# Patient Record
Sex: MALE | Race: WHITE | NOT HISPANIC OR LATINO | Employment: OTHER | URBAN - METROPOLITAN AREA
[De-identification: names, ages, dates, MRNs, and addresses within clinical notes are randomized per-mention and may not be internally consistent; named-entity substitution may affect disease eponyms.]

---

## 2018-02-15 ENCOUNTER — OFFICE VISIT (OUTPATIENT)
Dept: OTOLARYNGOLOGY | Facility: CLINIC | Age: 38
End: 2018-02-15
Payer: COMMERCIAL

## 2018-02-15 ENCOUNTER — CLINICAL SUPPORT (OUTPATIENT)
Dept: AUDIOLOGY | Facility: CLINIC | Age: 38
End: 2018-02-15
Payer: COMMERCIAL

## 2018-02-15 VITALS
BODY MASS INDEX: 22.03 KG/M2 | HEART RATE: 82 BPM | SYSTOLIC BLOOD PRESSURE: 122 MMHG | WEIGHT: 153.88 LBS | DIASTOLIC BLOOD PRESSURE: 78 MMHG | HEIGHT: 70 IN

## 2018-02-15 DIAGNOSIS — H90.11 CONDUCTIVE HEARING LOSS OF RIGHT EAR WITH UNRESTRICTED HEARING OF LEFT EAR: ICD-10-CM

## 2018-02-15 DIAGNOSIS — J34.2 DEVIATED NASAL SEPTUM: ICD-10-CM

## 2018-02-15 DIAGNOSIS — H90.11 CONDUCTIVE HEARING LOSS OF RIGHT EAR WITH UNRESTRICTED HEARING OF LEFT EAR: Primary | ICD-10-CM

## 2018-02-15 DIAGNOSIS — H69.91 EUSTACHIAN TUBE DYSFUNCTION, RIGHT: Primary | ICD-10-CM

## 2018-02-15 DIAGNOSIS — J30.2 PERENNIAL ALLERGIC RHINITIS WITH SEASONAL VARIATION: ICD-10-CM

## 2018-02-15 DIAGNOSIS — J30.89 PERENNIAL ALLERGIC RHINITIS WITH SEASONAL VARIATION: ICD-10-CM

## 2018-02-15 PROCEDURE — 92550 TYMPANOMETRY & REFLEX THRESH: CPT | Mod: S$GLB,,, | Performed by: AUDIOLOGIST

## 2018-02-15 PROCEDURE — 3008F BODY MASS INDEX DOCD: CPT | Mod: S$GLB,,, | Performed by: OTOLARYNGOLOGY

## 2018-02-15 PROCEDURE — 31231 NASAL ENDOSCOPY DX: CPT | Mod: S$GLB,,, | Performed by: OTOLARYNGOLOGY

## 2018-02-15 PROCEDURE — 92557 COMPREHENSIVE HEARING TEST: CPT | Mod: S$GLB,,, | Performed by: AUDIOLOGIST

## 2018-02-15 PROCEDURE — 99999 PR PBB SHADOW E&M-NEW PATIENT-LVL III: CPT | Mod: PBBFAC,,, | Performed by: OTOLARYNGOLOGY

## 2018-02-15 PROCEDURE — 99204 OFFICE O/P NEW MOD 45 MIN: CPT | Mod: 25,S$GLB,, | Performed by: OTOLARYNGOLOGY

## 2018-02-15 PROCEDURE — 99999 PR PBB SHADOW E&M-EST. PATIENT-LVL I: CPT | Mod: PBBFAC,,,

## 2018-02-15 NOTE — PROCEDURES
Nasal/sinus endoscopy  Date/Time: 2/15/2018 11:30 AM  Performed by: MARY DOMINGUEZ  Authorized by: MARY DOMINGUEZ     Consent Done?:  Yes (Verbal)  Anesthesia:     Local anesthetic:  Lidocaine 4% and Leon-Synephrine 1/2%    Patient tolerance:  Patient tolerated the procedure well with no immediate complications  Nose:     Procedure Performed:  Nasal Endoscopy  External:      No external nasal deformity  Intranasal:      Mucosa no polyps     Mucosa ulcers not present     No mucosa lesions present     Turbinates not enlarged     No septum gross deformity  Nasopharynx:      No mucosa lesions     Adenoids not present     Posterior choanae patent     Eustachian tube patent

## 2018-02-15 NOTE — PROGRESS NOTES
Mr. Araya was seen in the clinic today for a hearing evaluation. He reported a history of recurrent ear infections. Today, he reported decreased hearing in his right ear.     Audiological testing revealed a mild conductive hearing loss in the right ear and normal hearing in the left ear. A speech reception threshold was obtained at 25 dBHL in the right ear and 15 dBHL in the left ear. Speech discrimination was 100%, bilaterally.    Tympanometry revealed a Type B tympanogram in the right ear and a Type A tympanogram in the left ear.    Ipsilateral acoustic reflexes were absent, bilaterally.     Recommendations:  1. Otologic evaluation  2. Follow-up hearing evaluation  3. Noise protection

## 2018-02-15 NOTE — PROGRESS NOTES
Subjective:      Tirso Araya is a 37 y.o. male who was referred by a Dr. Aj for aural fullness.    He relates a long history of chronic feeling of fluid in the ears, which seemed to worsen upon moving to [place] from [place], [place], in 2014.  Prior to that, he had contended with allergies to tree pollen, though the rhinorrhea and sneezing seemed to have improved with the move to [place].  However, he now reports a nearly daily feeling of fluid in the ears, more prominent in the right ear, with associated hearing loss.  He denies nasal blockage, facial pressure, hyposmia or notable sinus infections.  He uses Claritin and Flonase for control of allergies, and denies a history of asthma.  He denies prior nasal trauma or surgery other than adenotonsillectomy as a child.    SNOT-22 score = 32, NOSE score = 20%, ETDQ-7 score = 4.0    Global QOL = 5%    Days missed = Less than 5      Past Medical History  He has a past medical history of Allergy.    Past Surgical History  He has a past surgical history that includes Adenoidectomy (2006) and Tonsillectomy (2006).    Family History  His family history includes Cancer in his father and paternal grandmother; Heart failure in his maternal grandmother; No Known Problems in his brother, maternal grandfather, paternal grandfather, and sister; Thyroid disease in his mother.    Social History  He reports that he has never smoked. He has never used smokeless tobacco. He reports that he does not drink alcohol.    Allergies  He has No Known Allergies.    Medications   He currently has no medications in their medication list.    Review of Systems  Review of Systems   Constitutional: Negative for fatigue, fever and unexpected weight change.   HENT: Positive for rhinorrhea. Negative for congestion, dental problem, ear discharge, ear pain, facial swelling, hearing loss, hoarse voice, nosebleeds, postnasal drip, sinus pressure, sore throat, tinnitus, trouble swallowing  "and voice change.    Eyes: Positive for itching. Negative for photophobia, discharge and visual disturbance.   Respiratory: Negative for apnea, cough, shortness of breath and wheezing.    Cardiovascular: Negative for chest pain and palpitations.   Gastrointestinal: Negative for abdominal pain, nausea and vomiting.   Endocrine: Negative for cold intolerance and heat intolerance.   Genitourinary: Negative for difficulty urinating.   Musculoskeletal: Negative for arthralgias, back pain, myalgias and neck pain.   Skin: Negative for rash.   Allergic/Immunologic: Positive for environmental allergies. Negative for food allergies.   Neurological: Negative for dizziness, seizures, syncope, weakness and headaches.   Hematological: Negative for adenopathy. Does not bruise/bleed easily.   Psychiatric/Behavioral: Negative for decreased concentration, dysphoric mood and sleep disturbance. The patient is not nervous/anxious.           Objective:     /78   Pulse 82   Ht 5' 10" (1.778 m)   Wt 69.8 kg (153 lb 14.1 oz)   BMI 22.08 kg/m²        Constitutional:   He appears well-developed. He is cooperative. Normal speech.  No hoarse voice.      Head:  Normocephalic. Salivary glands normal.  Facial strength is normal.      Ears:    Right Ear: No drainage or tenderness. Tympanic membrane is not perforated. Tympanic membrane mobility is normal. No middle ear effusion. No decreased hearing is noted.   Left Ear: No drainage or tenderness. Tympanic membrane is not perforated. Tympanic membrane mobility is normal.  No middle ear effusion. No decreased hearing is noted.     Nose:  Septal deviation present. No mucosal edema, rhinorrhea or polyps. No epistaxis. Turbinate hypertrophy.  Turbinates normal and no turbinate masses.  Right sinus exhibits no maxillary sinus tenderness and no frontal sinus tenderness. Left sinus exhibits no maxillary sinus tenderness and no frontal sinus tenderness.     Mouth/Throat  Oropharynx clear and moist " without lesions or asymmetry and normal uvula midline. He does not have dentures. Normal dentition. No oral lesions or mucous membrane lesions. No oropharyngeal exudate or posterior oropharyngeal erythema. Mirror exam not performed due to patient tolerance.  Mirror exam not performed due to patient tolerance.      Neck:  Neck normal without thyromegaly masses, asymmetry, normal tracheal structure, crepitus, and tenderness, thyroid normal, trachea normal and no adenopathy. Normal range of motion present.     He has no cervical adenopathy.     Cardiovascular:   Regular rhythm.      Pulmonary/Chest:   Effort normal.     Psychiatric:   He has a normal mood and affect. His speech is normal and behavior is normal.     Neurological:   No cranial nerve deficit.     Skin:   No rash noted.       Procedure    Nasal endoscopy performed.  See procedure note.     Left nasal valve     Left MT obscured by septal deviation     Left ET     Right nasal valve with septal deviation     Right MT     Adenoidectomy scar        Data Reviewed    No results found for: WBC  No results found for: EOSINOPHIL  No results found for: EOS  No results found for: PLT  No results found for: GLU  No results found for: IGE    No sinus imaging available.     I independently reviewed the tracings of the complete audiometric evaluation performed today.  I reviewed the audiogram with the patient as well.  Pertinent findings include a mild right conductive hearing loss with flat tympanogram on right, and normal on left.           Assessment:     1. Eustachian tube dysfunction, right    2. Perennial allergic rhinitis with seasonal variation    3. Deviated nasal septum         Plan:     I had a long discussion with the patient regarding his condition and the further workup and management options.  We discussed the options including continued medical therapy, tympanostomy tubes and eustachian tube dilation.  He is not interested in tubes, but wishes to consider  ET dilation for the future.  Because of the impaired access by the deviated septum, septoplasty under general anesthetic would be required concurrently.  For now, I prescribed Qnasl to capitalize on the aerosol property which permits deposition in the posterior nasal cavity, particularly the right side that is obstructed by the septal deviation, which has not been adequately reached by linear nasal sprays such as Flonase.    Follow-up if symptoms worsen or fail to improve.

## 2018-08-21 ENCOUNTER — TELEPHONE (OUTPATIENT)
Dept: UROLOGY | Facility: CLINIC | Age: 38
End: 2018-08-21

## 2018-08-21 NOTE — TELEPHONE ENCOUNTER
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   Is that possible? If not, they totally understand.    Communication Preference: [phone] shen (wife)

## 2018-08-23 ENCOUNTER — OFFICE VISIT (OUTPATIENT)
Dept: UROLOGY | Facility: CLINIC | Age: 38
End: 2018-08-23
Payer: COMMERCIAL

## 2018-08-23 VITALS
WEIGHT: 156 LBS | DIASTOLIC BLOOD PRESSURE: 71 MMHG | HEART RATE: 87 BPM | BODY MASS INDEX: 22.33 KG/M2 | HEIGHT: 70 IN | SYSTOLIC BLOOD PRESSURE: 114 MMHG

## 2018-08-23 DIAGNOSIS — Z30.2 ENCOUNTER FOR MALE STERILIZATION PROCEDURE: Primary | ICD-10-CM

## 2018-08-23 PROCEDURE — 99999 PR PBB SHADOW E&M-EST. PATIENT-LVL III: CPT | Mod: PBBFAC,,, | Performed by: UROLOGY

## 2018-08-23 PROCEDURE — 99204 OFFICE O/P NEW MOD 45 MIN: CPT | Mod: S$GLB,,, | Performed by: UROLOGY

## 2018-08-23 PROCEDURE — 3008F BODY MASS INDEX DOCD: CPT | Mod: CPTII,S$GLB,, | Performed by: UROLOGY

## 2018-08-23 RX ORDER — LIDOCAINE HYDROCHLORIDE 10 MG/ML
20 INJECTION INFILTRATION; PERINEURAL ONCE
Status: CANCELLED | OUTPATIENT
Start: 2018-08-23 | End: 2018-08-23

## 2018-08-23 NOTE — PROGRESS NOTES
Chief Complaint:   Undesired fertility    HPI:  Mr. Araya is a 37 y.o.  male who presents for evaluation re vasectomy. He has 3 children, ages 7, 5, and 3 yo, and he is certain that he desires no more. He is aware of other forms of contraception.  He's currently using nothing for contraception. He is aware that vasectomy is to be considered permanent/irreversible.    He denies orchalgia.  No dysuria.  No hematuria.    ROS:  Tirso Araya denies headache, blurred vision, fever, nausea, vomiting, chills, flank discomfort, abdominal pain, bleeding per rectum, cough, SOB, recent loss of consciousness, recent mental status changes, seizures, dizziness, or upper or lower extremity weakness.    Past Medical History    Past Medical History:   Diagnosis Date    Allergy        Past Surgical History    Past Surgical History:   Procedure Laterality Date    ADENOIDECTOMY  2006    Dr. Carpio in [place], [place]    TONSILLECTOMY  2006    Dr. Carpio in [place], [place]       Social History    Social History     Socioeconomic History    Marital status:      Spouse name: Not on file    Number of children: Not on file    Years of education: Not on file    Highest education level: Not on file   Social Needs    Financial resource strain: Not on file    Food insecurity - worry: Not on file    Food insecurity - inability: Not on file    Transportation needs - medical: Not on file    Transportation needs - non-medical: Not on file   Occupational History    Not on file   Tobacco Use    Smoking status: Never Smoker    Smokeless tobacco: Never Used   Substance and Sexual Activity    Alcohol use: No    Drug use: Not on file    Sexual activity: Not on file   Other Topics Concern    Not on file   Social History Narrative    Not on file       Family History  Family History   Problem Relation Age of Onset    Thyroid disease Mother     Cancer Father     No Known Problems Sister     No Known Problems Brother      Heart failure Maternal Grandmother     No Known Problems Maternal Grandfather     Cancer Paternal Grandmother     No Known Problems Paternal Grandfather          Medications  No current outpatient medications on file.    Allergies  Review of patient's allergies indicates:  No Known Allergies      ?  PHYSICAL EXAM:    The patient generally appears in good health, is appropriately interactive, and is in no apparent distress.     Eyes: anicteric sclerae, moist conjunctivae; no lid-lag; PERRLA     HENT: Atraumatic; oropharynx clear with moist mucous membranes and no mucosal ulcerations;normal hard and soft palate.  No evidence of lymphadenopathy.    Neck: Trachea midline.  No thyromegaly.    Musculoskeletal: No abnormal gait.    Skin: No lesions.    Mental: Cooperative with normal affect.  Is oriented to time, place, and person.    Neuro: Grossly intact.    Chest: Normal inspiratory effort.   No accessory muscles.  No audible wheezes.  Respirations symmetric on inspiration and expiration.    Heart: Regular rhythm.      Abdomen:  Soft, non-tender. No masses or organomegaly. Bladder is not palpable. No evidence of flank discomfort. No evidence of inguinal hernia.    Genitourinary: The penis has no evidence of plaques or induration. The urethral meatus is normal. The testes, epididymides, and cord structures are normal in size and contour bilaterally. The scrotum is normal in size and contour.    Extremities: No clubbing, cyanosis, or edema          A/P:  Tirso Araya is a 37 y.o. male who presents for evaluation re vasectomy.    1.I discussed with the patient risks and benefits, as well as alternatives. We discussed possible complications at length, including fever, infection, bleeding--possibly requiring reoperation,testicular injury or atrophy with loss of function, chronic pain, persistent or recurrent presence of sperm in the ejaculate, vasal recanalization, as well as the risks attendant to the  anesthetic.  2.We discussed that he should stop aspirin, ibuprofen, and similar products at least one week prior to the procedure. Acetominophen is okay to use for headaches, pain, etc.  3. He should bring an athletic supporter and loose fitting sweat pants on the day of the procedure.  4. We discussed that he must continue to use contraception until two consecutive semen analyses (checked at approximately 4-6 weeks post-vasectomy) reveal azoospermia.  5. He will schedule an elective vasectomy.

## 2018-08-23 NOTE — PATIENT INSTRUCTIONS
Having a Vasectomy: Before, During, and After the Procedure  Vasectomy is an outpatient (same day) procedure. It can be done in a doctors office, clinic, or hospital. Your doctor will talk with you about preparing for surgery. He or she will also discuss the possible risks and complications with you. After the procedure, follow all your doctors advice for recovery.  Preparing for Surgery      The cut ends of the vas may be tied, closed with a clip, or sealed by heat (cauterized).    Your doctor will talk with you about getting ready for surgery. You may be asked to do the following:  · Sign a consent form. This must be done at least a few days before surgery. It gives your doctor permission to do the procedure. It also states that a vasectomy is not guaranteed to make you sterile.  · Dont take aspirin, ibuprofen, or naproxen for 1 week before surgery or 1 week after. These medications can cause bleeding after the procedure. Also, tell your doctor if you take any medications, supplements, or herbal remedies.  · Tell your doctor if youve had any prior scrotal surgery.  · Arrange for an adult family member or friend to give you a ride home after surgery.  · Shower and clean your scrotum the day of surgery. Your doctor may also ask you to shave your scrotum.  · Bring an athletic supporter (jockstrap) and a pair of loose fitting pants to the doctors office or hospital.  · Eat no more than a light snack before surgery.  During Surgery  The entire procedure usually lasts less than 30 minutes.  · Youll be asked to undress and lie on a table.  · You may be given medication to help you relax. To prevent pain during surgery, youll be given an injection of local anesthetic in your scrotum or lower groin.  · Once the area is numb, one or two small incisions are made in the scrotum.   · The vas deferens are lifted through the incision and cut. The ends of the vas are then sealed off using one of several methods.  · If  needed, the incision is closed with stitches.  · You can rest for a while until youre ready to go home.  Recovering at Home  For about a week, your scrotum may look bruised and slightly swollen. You may also have a small amount of bloody discharge from the incision. This is normal.  To help make your recovery more comfortable, follow the tips below.  · Stay off your feet as much as possible for the first 2 days.   · Wear an athletic supporter or snug cotton briefs for support.  · Ice the area for 24 hours  · Take medications with acetaminophen (such as Tylenol) to relieve any discomfort. Dont use aspirin, ibuprofen, or naproxen.  · Avoid heavy lifting, exercise, or intercourse for 7 days.  · Remember: You must use another form of birth control until youre completely sterile.  Call your doctor if you notice any of the following after surgery:   · Increasing pain or swelling in your scrotum  · A large black-and-blue area, or a growing lump  · Fever or chills  · Increasing redness or drainage of the incision  · Trouble urinating    Sex After Vasectomy  Vasectomy doesnt change your sexual function. So when you start having sex again, it should feel the same as before. A vasectomy also shouldnt affect your relationship with your partner. Its important to remember, though, that you wont become sterile right away. It will take time before you can have sex without the need for birth control.  · Until youre sterile: After a vasectomy, some active sperm still remain in your semen. It will take time and many ejaculations before the sperm are completely gone. During this period, you must use another birth control method to prevent pregnancy. To make sure no sperm are left in your semen, youll need to have one or more semen exams. You usually collect a semen sample at home and bring it to a lab. The sample is then checked under a microscope. Youre sterile only when these samples show no evidence of sperm. Ask your  doctor whether additional follow-up is needed.  · After youre sterile: After your doctor tells you youre sterile, you no longer need to use any form of birth control. Youre free to have sex without the fear of unwanted pregnancy. However, a vasectomy does not protect you from sexually transmitted diseases (STDs). If you have more than one sex partner, be sure to practice safer sex by using condoms.  © 8479-0299 [facility], [address] [place] [address], [place] [address]. All rights reserved. This information is not intended as a substitute for professional medical care. Always follow your healthcare professional's instructions.    Vasectomy: Risks and Complications  A vasectomy is an outpatient procedure. This means youll go home the same day. Its done in a doctors office, clinic, or hospital. Before your procedure, youll be asked to read and sign a consent form. This form states youre aware of the possible risks and complications. It also says that you understand that the procedure, though most often successful, cant promise to make you sterile. Be sure you have all your questions answered before you sign this form. Below is a list of risks and possible complications of the procedure.  Risks and Possible Complications of Vasectomy   Vasectomy is safe. But it does have risks. They include the following:  · Bleeding or infection.  · Sperm granuloma. This is a small, harmless lump. It may form where the vas deferens is sealed off.  · Loss of Testicle  · Epididymitis.This is inflammation that may cause scrotal aching. It often goes away without treatment. Anti-inflammatory medications can provide relief.  · Reconnection of the vas deferens. This can occur in rare cases. It makes you fertile again. This can result in an unwanted pregnancy.  · Sperm antibodies.These are a common response of the body to absorbed sperm. The antibodies can make you sterile. This is true even if you later try to reverse your  vasectomy.  · Long-term testicular discomfort.This may occur after surgery. But its very rare.    © 1760-2845 [facility], [address] [place] [address], [place] [address]. All rights reserved. This information is not intended as a substitute for professional medical care. Always follow your healthcare professional's instructions.

## 2018-12-10 ENCOUNTER — TELEPHONE (OUTPATIENT)
Dept: DERMATOLOGY | Facility: CLINIC | Age: 38
End: 2018-12-10

## 2018-12-10 NOTE — TELEPHONE ENCOUNTER
----- Message from Radha Navarro sent at 12/10/2018  2:06 PM CST -----  Contact: pts wife at [phone]  [facility]-Pt will be NP to  [facility].  His son sees her.  Has a family hx of skin cancer and would like appt before Middle of February the next availalbe

## 2019-07-01 ENCOUNTER — TELEPHONE (OUTPATIENT)
Dept: SPORTS MEDICINE | Facility: CLINIC | Age: 39
End: 2019-07-01

## 2019-07-01 DIAGNOSIS — M25.512 LEFT SHOULDER PAIN, UNSPECIFIED CHRONICITY: Primary | ICD-10-CM

## 2019-07-01 NOTE — TELEPHONE ENCOUNTER
CC: Left shoulder pain    S: I evaluated the patient at the [facility] facility.  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  He reports a long history in regards to his left shoulder.  History of insidious onset, gradually worsening pain and subjective instability events (no dislocation) requiring arthroscopic labral repair in 2010 in [place].  This was complicated by continued symptoms requiring additional surgery in 2015 with Dr. Monty Otto. This included an arthroscopic SLAP repair. Unfortunately, the patient never fully recovered from this and has had continued difficulty with repetitive overhead activities.  Complaint primarily remains pain which he localizes over the anterior glenohumeral joint line with only intermittent posterior superior pain as before, which preceded his 2nd surgery. The shoulder does feel like it still slides out of place periodically.  He has to limit his golf participation as a result. 5/10 pain most days after activity. He plays golf and lifts weights in the gym several times a week.  Pain is most prominent after activity.  Conservative treatment for this has been extensive to include physical therapy with our [facility] staff, along with oral medication and activity modification.  The patient denies any neck or radicular symptoms. He does have some occasional numbness in the C8 distribution without any associated elbow pain. This is not his primary complaint.    O:   General exam demonstrates good fitness.  Exam of the left shoulder demonstrates previous arthroscopic incisions.  No swelling or deformity.  Nontender over the AC joint.  Mild to moderate tenderness over the biceps long head groove proximally. Tender also over the anterior glenohumeral joint line. Nontender over the posterior glenohumeral joint line. Mild discomfort with Jerk test. +Click.  Palpable click with compression rotation test without pain.  Intact overhead range of motion. Negative external impingement signs. Negative Moniteau's test.  Mildly positive biceps tension signs. Intact rotator cuff strength without pain on resistance testing.  Negative belly press test.  Negative anterior apprehension. Grade 1 anterior load shift.  Grade 2 posterior load shift.  Positive posterior push-pull maneuver for some discomfort and subjective instability. Negative sulcus.  No scapular winging or dyskinesis.  No midline neck tenderness.    A:  Left shoulder pain status post 2 previous arthroscopic labral repairs.  Clinical exam findings consistent with biceps tendinitis and suspected residual or recurrent posterior labral pathology.    P:  The patient has had pain since his surgery in 2015.  This has gradually worsened for him despite extensive conservative treatment.  Exam findings are suspicious for continued or recurrent biceps-labral pathology. I have recommended xrays and an MRA to assess for any structural pathology. I will see him back after imaging to discuss results and treatment options.

## 2019-07-02 ENCOUNTER — HOSPITAL ENCOUNTER (OUTPATIENT)
Dept: RADIOLOGY | Facility: HOSPITAL | Age: 39
Discharge: HOME | End: 2019-07-02
Attending: ORTHOPAEDIC SURGERY
Payer: COMMERCIAL

## 2019-07-02 DIAGNOSIS — M25.512 LEFT SHOULDER PAIN, UNSPECIFIED CHRONICITY: ICD-10-CM

## 2019-07-02 PROCEDURE — 23350 XR ARTHROGRAM SHOULDER LEFT: ICD-10-PCS | Mod: ,,, | Performed by: RADIOLOGY

## 2019-07-02 PROCEDURE — 25500020 PHARM REV CODE 255: Performed by: ORTHOPAEDIC SURGERY

## 2019-07-02 PROCEDURE — 73222 MRI JOINT UPR EXTREM W/DYE: CPT | Mod: TC,LT

## 2019-07-02 PROCEDURE — 23350 INJECTION FOR SHOULDER X-RAY: CPT | Mod: ,,, | Performed by: RADIOLOGY

## 2019-07-02 PROCEDURE — A9585 GADOBUTROL INJECTION: HCPCS | Performed by: ORTHOPAEDIC SURGERY

## 2019-07-02 PROCEDURE — 25000003 PHARM REV CODE 250: Performed by: ORTHOPAEDIC SURGERY

## 2019-07-02 PROCEDURE — 73222 MRI ARTHROGRAM SHOULDER WITH CONTRAST LEFT: ICD-10-PCS | Mod: 26,LT,, | Performed by: RADIOLOGY

## 2019-07-02 PROCEDURE — 73040 CONTRAST X-RAY OF SHOULDER: CPT | Mod: TC,LT

## 2019-07-02 PROCEDURE — 73040 CONTRAST X-RAY OF SHOULDER: CPT | Mod: 26,LT,, | Performed by: RADIOLOGY

## 2019-07-02 PROCEDURE — 73040 XR ARTHROGRAM SHOULDER LEFT: ICD-10-PCS | Mod: 26,LT,, | Performed by: RADIOLOGY

## 2019-07-02 PROCEDURE — 73222 MRI JOINT UPR EXTREM W/DYE: CPT | Mod: 26,LT,, | Performed by: RADIOLOGY

## 2019-07-02 RX ORDER — LIDOCAINE HYDROCHLORIDE 10 MG/ML
3 INJECTION INFILTRATION; PERINEURAL ONCE
Status: COMPLETED | OUTPATIENT
Start: 2019-07-02 | End: 2019-07-02

## 2019-07-02 RX ORDER — GADOBUTROL 604.72 MG/ML
7 INJECTION INTRAVENOUS
Status: COMPLETED | OUTPATIENT
Start: 2019-07-02 | End: 2019-07-02

## 2019-07-02 RX ADMIN — GADOBUTROL 7 ML: 604.72 INJECTION INTRAVENOUS at 02:07

## 2019-07-02 RX ADMIN — IOHEXOL 8 ML: 300 INJECTION, SOLUTION INTRAVENOUS at 02:07

## 2019-07-02 RX ADMIN — LIDOCAINE HYDROCHLORIDE 3 ML: 10 INJECTION, SOLUTION INFILTRATION; PERINEURAL at 02:07

## 2019-07-10 ENCOUNTER — TELEPHONE (OUTPATIENT)
Dept: SPORTS MEDICINE | Facility: CLINIC | Age: 39
End: 2019-07-10

## 2019-07-10 RX ORDER — DIAZEPAM 10 MG/1
10 TABLET ORAL ONCE
Qty: 1 TABLET | Refills: 0 | Status: SHIPPED | OUTPATIENT
Start: 2019-07-10 | End: 2019-07-10

## 2019-07-11 ENCOUNTER — OFFICE VISIT (OUTPATIENT)
Dept: SPORTS MEDICINE | Facility: CLINIC | Age: 39
End: 2019-07-11
Payer: COMMERCIAL

## 2019-07-11 VITALS — WEIGHT: 158 LBS | TEMPERATURE: 98 F | BODY MASS INDEX: 22.62 KG/M2 | HEIGHT: 70 IN

## 2019-07-11 VITALS
HEIGHT: 70 IN | BODY MASS INDEX: 22.33 KG/M2 | HEART RATE: 63 BPM | WEIGHT: 156 LBS | DIASTOLIC BLOOD PRESSURE: 79 MMHG | SYSTOLIC BLOOD PRESSURE: 134 MMHG

## 2019-07-11 DIAGNOSIS — M75.22 BICEPS TENDINITIS OF LEFT UPPER EXTREMITY: ICD-10-CM

## 2019-07-11 DIAGNOSIS — S43.432A SUPERIOR GLENOID LABRUM LESION OF LEFT SHOULDER, INITIAL ENCOUNTER: Primary | ICD-10-CM

## 2019-07-11 DIAGNOSIS — M25.512 ACUTE PAIN OF LEFT SHOULDER: Primary | ICD-10-CM

## 2019-07-11 DIAGNOSIS — S46.112S: ICD-10-CM

## 2019-07-11 PROCEDURE — 99999 PR PBB SHADOW E&M-EST. PATIENT-LVL III: ICD-10-PCS | Mod: PBBFAC,,, | Performed by: ORTHOPAEDIC SURGERY

## 2019-07-11 PROCEDURE — 99203 OFFICE O/P NEW LOW 30 MIN: CPT | Mod: S$GLB,,, | Performed by: ORTHOPAEDIC SURGERY

## 2019-07-11 PROCEDURE — 99999 PR PBB SHADOW E&M-EST. PATIENT-LVL III: CPT | Mod: PBBFAC,,, | Performed by: ORTHOPAEDIC SURGERY

## 2019-07-11 PROCEDURE — 99203 PR OFFICE/OUTPT VISIT, NEW, LEVL III, 30-44 MIN: ICD-10-PCS | Mod: S$GLB,,, | Performed by: ORTHOPAEDIC SURGERY

## 2019-07-11 PROCEDURE — 99999 PR PBB SHADOW E&M-EST. PATIENT-LVL II: ICD-10-PCS | Mod: PBBFAC,,, | Performed by: FAMILY MEDICINE

## 2019-07-11 PROCEDURE — 99499 NO LOS: ICD-10-PCS | Mod: CSM,S$GLB,, | Performed by: FAMILY MEDICINE

## 2019-07-11 PROCEDURE — 0232T NJX PLATELET PLASMA: CPT | Mod: PBBFAC,PO | Performed by: FAMILY MEDICINE

## 2019-07-11 PROCEDURE — 0232T PR INJECT PLATELET PLASMA W/IMG HARVEST/PREPARATOIN: ICD-10-PCS | Mod: S$PBB,CSM,, | Performed by: FAMILY MEDICINE

## 2019-07-11 PROCEDURE — 3008F BODY MASS INDEX DOCD: CPT | Mod: CPTII,S$GLB,, | Performed by: ORTHOPAEDIC SURGERY

## 2019-07-11 PROCEDURE — 99499 UNLISTED E&M SERVICE: CPT | Mod: CSM,S$GLB,, | Performed by: FAMILY MEDICINE

## 2019-07-11 PROCEDURE — 0232T NJX PLATELET PLASMA: CPT | Mod: S$PBB,CSM,, | Performed by: FAMILY MEDICINE

## 2019-07-11 PROCEDURE — 99999 PR PBB SHADOW E&M-EST. PATIENT-LVL II: CPT | Mod: PBBFAC,,, | Performed by: FAMILY MEDICINE

## 2019-07-11 PROCEDURE — 99212 OFFICE O/P EST SF 10 MIN: CPT | Mod: PBBFAC,25,PO | Performed by: FAMILY MEDICINE

## 2019-07-11 PROCEDURE — 3008F PR BODY MASS INDEX (BMI) DOCUMENTED: ICD-10-PCS | Mod: CPTII,S$GLB,, | Performed by: ORTHOPAEDIC SURGERY

## 2019-07-11 NOTE — PROGRESS NOTES
Patient indications  Tirso Araya, a 38 y.o. male, presents today with:  Left glenohumeral joint pain  Left long head of biceps tendinitis  (see today's note by MARCELL Lezama MD)    Procedure performed  Mesenchymal cell (MSC) + leukocyte reduced platelet rich plasma (PRP) injection performed using ultrasound guidance for exact placement of orthobiologic at pathologic site     Patient consent to perform procedure  See the electronic medical record for full written and signed patient consent to proceed with this procedure.    Description of procedure    A) Surgical time out  A surgical time out was performed, including verification of patient ID, procedure to be performed, site and side, availability of information and equipment, review of safety issues, and the patients agreement and consent.    B) Phlebotomy, platelet harvest, and PRP preparation  Sterile technique was used to phlebotomize 120mL of the patients blood from a peripheral vein. This blood was  into density-divided layers using an [employer] Digital Jamey centrifuge. The layer of leukocyte poor (hematocrit 2%) PRP, a volume of 2.4mL, was placed into a sterile syringe in preparation for injection.     C) Bone marrow aspiration, mesenchymal cell harvest, and mesenchymal cell preparation  The posterior superior iliac spine (PSIS) of the iliac crest was identified using 1) physical examination and 2) musculoskeletal ultrasound. The procedure site was marked with a skin marker. The skin about the area was sterilized with ChloraPrep (2%w/v CHG, 70% IPA). The skin, subdermal soft tissues, and periosteum of the PSIS were anesthetized using 1% lidocaine. A blade no. 11 scalpel was used to make a single incision in the skin above the PSIS, through which a 12g bone marrow aspiration needle was introduced to the depth of the osseous cortex. The 12g needle was rotated under light pressure until penetration of the cortex was achieved. A volume of 118mL of the  patients bone marrow was aspirated through the 12g needle. This bone marrow was  into density-divided layers using an [employer] Jamey centrifuge. The layer of mesenchymal cells, a volume of 1.7mL, was placed into a sterile syringe in preparation for injection. Following the mesenchymal cell harvest, a sterile 4 x 4 inch gauze was placed over the incision site and the gauze was covered with a sterile transparent dressing.     D) Orthobiologic delivery to pathologic site    Injection sites:  1) LEFT long head of biceps tendon  2) LEFT intra articular glenohumeral joint    Using 1) physical examination and 2) musculoskeletal ultrasound and 3) recent MRI, the anatomy was visualized and the diseased tissue was identified and confirmed. The procedure site was marked with a skin marker. The patient was placed in position maximizing 1) physician access to pathologic tissue and 2) patient comfort. The skin about the area was sterilized with ChloraPrep (2%w/v CHG, 70% IPA). The site of needle insertion was anesthetized using vapocoolant. Under ultrasound guidance, the needle was advanced to the site of tissue pathology, and the injectate was deposited under direct visualization.     E) Post-procedure care  Following the procedure, a sterile bandage was placed over the injection site.    Complications: none     Estimated blood loss from injection procedure: none     Joint aspirate volume: 0 mL     Disposition  The patient tolerated the procedure well and there were no immediate complications. The patient was instructed to call the clinic immediately for any mild to moderate adverse side effects, or to call 911 in the event of an emergency.      Description of ultrasound utilization for needle / probe guidance  Ultrasound guidance was used for needle / probe localization. Images (and videos, if appropriate) were saved and stored for documentation. Dynamic visualization of the needle / probe was continuous throughout the  procedure.    [id number]

## 2019-07-13 NOTE — PROGRESS NOTES
"CC: LEFT shoulder pain     38 y.o. Male who returns to see me to discuss MRI results.  The patient's complaint is pain with intermittent pseudo instability symptoms. He has had problems off and on for several years.  Two previous surgeries as documented before.  The patient denies any subluxation or dislocation events.  Current symptoms limit his ability to compete in golf and also requires that he guards the shoulder intermittently with day-to-day activity.  We have had several phone conversations regarding his treatment options.  He has consulted with Dr. Monty Otto as well. Plan is to proceed with biologic injection today.    Pain Score: 0-No pain    PAST MEDICAL HISTORY:   Past Medical History:   Diagnosis Date    Allergy        PAST SURGICAL HISTORY:  Past Surgical History:   Procedure Laterality Date    ADENOIDECTOMY  2006    Dr. Carpio in [place], [place]    TONSILLECTOMY  2006    Dr. Carpio in [place], [place]       FAMILY HISTORY:  Family History   Problem Relation Age of Onset    Thyroid disease Mother     Cancer Father     No Known Problems Sister     No Known Problems Brother     Heart failure Maternal Grandmother     No Known Problems Maternal Grandfather     Cancer Paternal Grandmother     No Known Problems Paternal Grandfather        MEDICATIONS:    Current Outpatient Medications:     diazePAM (VALIUM) 10 MG Tab, Take 1 tablet (10 mg total) by mouth once. for 1 dose, Disp: 1 tablet, Rfl: 0    ALLERGIES:  Review of patient's allergies indicates:  No Known Allergies     REVIEW OF SYSTEMS:  Constitution: Negative. Negative for chills, fever and night sweats.    Hematologic/Lymphatic: Negative for bleeding problem. Does not bruise/bleed easily.   Skin: Negative for dry skin, itching and rash.   Musculoskeletal: Negative for falls. Positive for left shoulder pain and  muscle weakness.     PHYSICAL EXAMINATION:  Vitals:  /79   Pulse 63   Ht 5' 10" (1.778 m)   Wt 70.8 kg (156 lb)  "  BMI 22.38 kg/m²    General: Well-developed well-nourished 38 y.o. malein no acute distress   Cardiovascular: Regular rhythm by palpation of distal pulse, normal color and temperature, no concerning varicosities on symptomatic side   Lungs: No labored breathing or wheezing appreciated   Neuro: Alert and oriented ×3   Psychiatric: well oriented to person, place and time, demonstrates normal mood and affect   Skin: No rashes, lesions or ulcers, normal temperature, turgor, and texture on involved extremity    Ortho/SPM Exam  Examination again of left shoulder demonstrates point tenderness deep over the anterior glenohumeral joint line and also over the proximal biceps groove.  Minimal tenderness over the AC joint.  No tenderness over the posterior superior glenohumeral joint line. Full overhead range of motion. Negative anterior apprehension. Stable load shift testing.  Grade 1 posterior load shift with some pain and a click.  Intact rotator cuff strength without pain on testing.  Positive biceps tension signs.    IMAGING:  Xrays including AP, Outlet and Axillary Lateral of LEFT shoulder are ordered / images reviewed by me:   Evidence of previous surgery. No bone loss seen. No significant DJD    MRI of the LEFT shoulder reviewed by me and discussed with patient. Study shows: Status post labral repair.  Contrast undercutting the superior to anterior labrum concerning for residual or recurrent labral tear. Chondral fissuring over the anterior inferior glenoid.    I have discussed the case with Dr. Healy and agree with read.  Contrast undercutting the superior labral biceps attachment and extending into the labral substance consistent with recurrent/residual tearingm particular in the context of the patient's exam findings and history.    ASSESSMENT:      ICD-10-CM ICD-9-CM   1. Superior glenoid labrum lesion of left shoulder, initial encounter S43.432A 840.7   2. Biceps tendinitis of left upper extremity M75.22 726.12      PLAN:       Imaging was reviewed.  The patient appears symptomatic over the anterior glenohumeral joint space and the proximal biceps.  Conservative care was recommended.  The patient would like to proceed with a biologic injection. At his request, we will proceed with a combined leukocyte poor PRP and a bone marrow derived stem cell injection today with Dr. Norman.  Pertinent literature was discussed in regards to this treatment modality.  I have counseled the patient that an appropriate course of physical therapy also is important.  I will have him work with Oumar Kwong to focus on rotator cuff strengthening and scapular stabilization exercises.  We will plan to shut things down for about 2 weeks after the injection for rest. No golf. No UE lifting. He will start with Oumar in 10 days.  Plan to take a 6 week period of time off from golf. I will see him back in 3 weeks    Procedures

## 2019-07-23 ENCOUNTER — CLINICAL SUPPORT (OUTPATIENT)
Dept: REHABILITATION | Facility: HOSPITAL | Age: 39
End: 2019-07-23
Attending: FAMILY MEDICINE
Payer: COMMERCIAL

## 2019-07-23 ENCOUNTER — TELEPHONE (OUTPATIENT)
Dept: SPORTS MEDICINE | Facility: CLINIC | Age: 39
End: 2019-07-23

## 2019-07-23 DIAGNOSIS — M62.81 MUSCLE WEAKNESS OF LEFT UPPER EXTREMITY: ICD-10-CM

## 2019-07-23 DIAGNOSIS — M75.22 BICIPITAL TENDINITIS OF LEFT SHOULDER: ICD-10-CM

## 2019-07-23 DIAGNOSIS — S43.432S SUPERIOR GLENOID LABRUM LESION OF LEFT SHOULDER, SEQUELA: Primary | ICD-10-CM

## 2019-07-23 PROCEDURE — 97161 PT EVAL LOW COMPLEX 20 MIN: CPT | Performed by: PHYSICAL THERAPIST

## 2019-07-23 PROCEDURE — 97110 THERAPEUTIC EXERCISES: CPT | Performed by: PHYSICAL THERAPIST

## 2019-07-23 NOTE — PROGRESS NOTES
Please see Treatment section for Initial Evaluation and Plan of Care  Oumar Kwong, PT  7/23/2019

## 2019-07-23 NOTE — PLAN OF CARE
"[facility]  Physical Therapy Initial Evaluation    Name: Tirso Araya  Clinic Number: [id number]    Therapy Diagnosis:   Encounter Diagnosis   Name Primary?    Muscle weakness of left upper extremity      Physician: Rayray Norman, *    Physician Orders: PT Eval and Treat   Medical Diagnosis:   S43.432S (ICD-10-CM) - Superior glenoid labrum lesion of left shoulder, sequela   M75.22 (ICD-10-CM) - Bicipital tendinitis of left shoulder     Evaluation Date: 7/23/2019  Authorization Period Expiration: 12/31/2019  Plan of Care Certification Period: 12/31/2019  Visit # / Visits authorized: 1/ 20    Time In: 12:00  Time Out: 13:00  Total Billable Time: 60' minutes    Precautions: Standard    Subjective   Date of onset: chronic   History of current condition - Tirso reports originally having pain/dysfunction in R shoulder in 2009, underwent s' stabilization procedure in 2010, got back to 75% and then underwent revision procedure in 2015, feels he got back to 80%, then, approx 1 yr ago began having increased s' pain/dysfunction leading him to see MD, underwent stem cell injection and was referred to PT     Past Medical History:   Diagnosis Date    Allergy      Tirso Araya  has a past surgical history that includes Adenoidectomy (2006) and Tonsillectomy (2006).    Tirso has a current medication list which includes the following prescription(s): diazepam.    Review of patient's allergies indicates:  No Known Allergies     Pain:  Current 0/10, worst 0/10, best 0/10   Location: left shoulder   Description: NA since injection   Aggravating Factors: NA  Easing Factors: NA    Prior Therapy: yes  Social History:  lives with their family  Occupation: actor   Prior Level of Function: I  Current Level of Function: I    Pts goals: "get my shoulder strong and stable, work out and play golf"     Objective     Observation: good unsupported sitting posture, no overt muscle atrophy, no guarding L " "shoulder/UE    Range of Motion:   AROM Right Left Comment   Shoulder Elevatiom: WFLs degrees WFLs degrees    PROM Right Left Comment   Shoulder Flexion: 180 degrees 180 degrees    Shoulder Abduction: 180 degrees 180 degrees    Shoulder ER, 90°ABD: 125 degrees 103 degrees    Shoulder IR, 90° ABD: 46 degrees 53 degrees      FIR -2"    Strength:    Right Left Comment   Shoulder flexion: 5/5 5/5    Shoulder Abduction: 5/5 5/5    Shoulder ER: 5/5 5/5    Shoulder IR: 5/5 5/5      Scapular Control/Dyskinesis: mod scap dyskinesis    Special Tests: neg Arnett, neg empty can, neg instability tests    Palpation: (-) TTP t/o L shoulder     TREATMENT     Treatment Time In: 12:30  Treatment Time Out: 1:00  Total Treatment time separate from Evaluation time:15'    Tirso received therapeutic exercises to develop strength and endurance for 15 minutes including:    y-t-w 3x10 0#  Sup pro 3x15 3#  S/L ER 3xburn 1#  Prone s' ext/ER 3x15 1#    Education     Home Exercises Provided and Patient Education Provided     Education provided:   - role of scapular position in s' function     Written Home Exercises Provided: yes.  Exercises were reviewed and Tirso was able to demonstrate them prior to the end of the session.  Tirso demonstrated good  understanding of the education provided.     See EMR under hand out for exercises provided 7/23/2019.      Assessment   Tirso is a 38 y.o. male referred to outpatient Physical Therapy with a medical diagnosis of  R shoulder dysfunction . Pt presents with     Decreased range of motion   Decreased scapular motor control  Decreased s function     Pt prognosis is Good.   Pt will benefit from skilled outpatient Physical Therapy to address the deficits stated above and in the chart below, provide pt/family education, and to maximize pt's level of independence.     Plan of care discussed with patient: Yes  Pt's spiritual, cultural and educational needs considered and pt agreeable to plan of care and goals " "as stated below:     Anticipated Barriers for therapy:  2 previous surgeries      Medical Necessity is demonstrated by the following  History  Co-morbidities and personal factors that may impact the plan of care Co-morbidities:   none    Personal Factors:   no deficits     low   Examination  Body Structures and Functions, activity limitations and participation restrictions that may impact the plan of care Body Regions:   upper extremities    Body Systems:    ROM  strength  motor control    Participation Restrictions:     Recreation/leisure     Activity limitations:   Mobility  lifting and carrying objects    Self care  no deficits    Domestic Life  no deficits    Community and Social Life  recreation and leisure         low   Clinical Presentation stable and uncomplicated low   Decision Making/ Complexity Score: low     Goals     Short-Term Goals: 6 weeks  - The patient will be independent with initial home exercise program.  - The patient will demonstrate good unsupported sitting posture with min verbal cues for 15 minutes.  - The patient will increase ROM = to R shoulder to perform recreation/leisure activities including working out/golf with pain < 010.  - The patient will increase strength by 1.2 muscle grade  to perform"   "  with pain < 0/10.      Plan   Certification Period: 7/23/2019 to  9/1/2019.    Outpatient Physical Therapy 1 times weekly for 6 weeks to include the following interventions: patient education, Manual Therapy, Moist Heat/ Ice, Neuromuscular Re-ed, Patient Education, Therapeutic Activites and Therapeutic Exercise.     Oumar Kwong, PT  "

## 2019-07-30 ENCOUNTER — CLINICAL SUPPORT (OUTPATIENT)
Dept: REHABILITATION | Facility: HOSPITAL | Age: 39
End: 2019-07-30
Attending: FAMILY MEDICINE
Payer: COMMERCIAL

## 2019-07-30 DIAGNOSIS — M62.81 MUSCLE WEAKNESS OF LEFT UPPER EXTREMITY: ICD-10-CM

## 2019-07-30 PROCEDURE — 97110 THERAPEUTIC EXERCISES: CPT | Performed by: PHYSICAL THERAPIST

## 2019-07-30 PROCEDURE — 97112 NEUROMUSCULAR REEDUCATION: CPT | Performed by: PHYSICAL THERAPIST

## 2019-07-30 NOTE — PROGRESS NOTES
"  Physical Therapy Daily Treatment Note     Visit Date: 7/30/2019    Name: Tirso Araya  [facility] Number: [id number]    Therapy Diagnosis:   Encounter Diagnosis   Name Primary?    Muscle weakness of left upper extremity      Physician: Raryay Norman, *  Dr. Lezama      Physician Orders: PT Eval and Treat   Medical Diagnosis:   S43.432S (ICD-10-CM) - Superior glenoid labrum lesion of left shoulder, sequela   M75.22 (ICD-10-CM) - Bicipital tendinitis of left shoulder     Evaluation Date: 7/23/2019  Authorization Period Expiration: 12/31/2019  Plan of Care Certification Period: 12/31/2019  Visit # / Visits authorized: 2/ 20    Time In: 10:00  Time Out: 11:00  Total Billable Time: 45 minutes    Precautions: Standard      Subjective      Pt reports "I feel like my scapula is pretty strong"     he was compliant with home exercise program given last session.   Response to previous treatment good   Functional change: none as yet    Pain: 0/10  Location: left shoulder      Objective     Measurements taken:  None this AM     Tirso received therapeutic exercises to develop strength, endurance and core stabilization for 30 minutes including:    [place] FW 1x30  [place] POS 1x30  Plank push up plus to wt bench 3x10  B Bridge 2x10:10  LLR with back against wall 2x10:05 R/L 0#    Tirso participated in neuromuscular re-education activities to improve: Proprioception for 15 minutes. The following activities were included:    TB iso walk ER 3x5 green  Alphabet FW yll 3xburn and side yll 3xburn   St 3D core activation 1x30 each     Home Exercises Provided and Patient Education Provided     Education provided:   -  Role of core/hips in function     Written Home Exercises Provided: .  Exercises were reviewed and Tirso was able to demonstrate them prior to the end of the session.  Tirso demonstrated good  understanding of the education provided.     See EMR under hand out for exercises provided 7/30/2019.      Assessment     geovanna " "Rx without increase in sxs   Good dynamic stability L shoulder, however, did have some "awareness" of L shoulder anteriorly with attempted TB iso walk IR 90-90     Tirso is progressing well towards his goals.   Pt prognosis is Good.     Pt will continue to benefit from skilled outpatient physical therapy to address the deficits listed in the problem list box on initial evaluation, provide pt/family education and to maximize pt's level of independence in the home and community environment.     Pt's spiritual, cultural and educational needs considered and pt agreeable to plan of care and goals.    Anticipated barriers to physical therapy: none    Goals:     Short-Term Goals: 6 weeks  - The patient will be independent with initial home exercise program.  - The patient will demonstrate good unsupported sitting posture with min verbal cues for 15 minutes.  - The patient will increase ROM = to R shoulder to perform recreation/leisure activities including working out/golf with pain < 010.  - The patient will increase strength by 1.2 muscle grade  to perform"   "  with pain < 0/10.      Plan     Continue with established Plan of Care towards PT goals with focus on decreasing pain, increasing ROM, strength, neuromuscular control and functional status       Oumar Kwong, PT   "

## 2019-08-06 ENCOUNTER — CLINICAL SUPPORT (OUTPATIENT)
Dept: REHABILITATION | Facility: HOSPITAL | Age: 39
End: 2019-08-06
Attending: FAMILY MEDICINE
Payer: COMMERCIAL

## 2019-08-06 DIAGNOSIS — M62.81 MUSCLE WEAKNESS OF LEFT UPPER EXTREMITY: ICD-10-CM

## 2019-08-06 PROCEDURE — 97110 THERAPEUTIC EXERCISES: CPT | Performed by: PHYSICAL THERAPIST

## 2019-08-06 NOTE — PROGRESS NOTES
"  Physical Therapy Daily Treatment Note     Visit Date: 8/6/2019    Name: Tirso Araya  Clinic Number: [id number]    Therapy Diagnosis:   Encounter Diagnosis   Name Primary?    Muscle weakness of left upper extremity      Physician: Rayray Norman, *  Dr. Lezama      Physician Orders: PT Eval and Treat   Medical Diagnosis:   S43.432S (ICD-10-CM) - Superior glenoid labrum lesion of left shoulder, sequela   M75.22 (ICD-10-CM) - Bicipital tendinitis of left shoulder     Evaluation Date: 7/23/2019  Authorization Period Expiration: 12/31/2019  Plan of Care Certification Period: 12/31/2019  Visit # / Visits authorized: 3/ 20    Time In: 10:30  Time Out: 11:30  Total Billable Time: 45 minutes    Precautions: Standard      Subjective      Pt reports no new c/o this AM in L shoulder, occasional "awareness" but overall doing well     he was compliant with home exercise program given last session.   Response to previous treatment good   Functional change: none as yet    Pain: 0/10  Location: left shoulder      Objective     Measurements taken:  None this AM     Tirso received therapeutic exercises to develop strength, endurance and core stabilization for 45 minutes including:    y-t-w 3x10 1#  Rolling series 1x5 4D  CC 1/2 kneeling lifts 2x10 R/L 4p  CC 1/2 kneeling chops 6p R/L 2x10  plyo chops 2kg 3x10 R/L   plyo lifts 6lbs 3x10 R/L  St hip ER and IR pink cook band resist 2x15 R/L with yll pole assist      Home Exercises Provided and Patient Education Provided     Education provided:   -  Role of core/hips in function     Written Home Exercises Provided: .  Exercises were reviewed and Tirso was able to demonstrate them prior to the end of the session.  Tirso demonstrated good  understanding of the education provided.     See EMR under hand out for exercises provided  8/6/2019      Assessment     geovanna Rx without increase in sxs   Improving core activation which will unload L shoulder indirectly with ADLs and " "rec/leisure activities     Tirso is progressing well towards his goals.   Pt prognosis is Good.     Pt will continue to benefit from skilled outpatient physical therapy to address the deficits listed in the problem list box on initial evaluation, provide pt/family education and to maximize pt's level of independence in the home and community environment.     Pt's spiritual, cultural and educational needs considered and pt agreeable to plan of care and goals.    Anticipated barriers to physical therapy: none    Goals:     Short-Term Goals: 6 weeks  - The patient will be independent with initial home exercise program.  - The patient will demonstrate good unsupported sitting posture with min verbal cues for 15 minutes.  - The patient will increase ROM = to R shoulder to perform recreation/leisure activities including working out/golf with pain < 010.  - The patient will increase strength by 1.2 muscle grade  to perform"   "  with pain < 0/10.      Plan     Continue with established Plan of Care towards PT goals with focus on decreasing pain, increasing ROM, strength, neuromuscular control and functional status       Oumar Kwong, PT   "

## 2019-08-12 ENCOUNTER — CLINICAL SUPPORT (OUTPATIENT)
Dept: REHABILITATION | Facility: HOSPITAL | Age: 39
End: 2019-08-12
Attending: FAMILY MEDICINE
Payer: COMMERCIAL

## 2019-08-12 DIAGNOSIS — M62.81 MUSCLE WEAKNESS OF LEFT UPPER EXTREMITY: ICD-10-CM

## 2019-08-12 PROCEDURE — 97110 THERAPEUTIC EXERCISES: CPT | Performed by: PHYSICAL THERAPIST

## 2019-08-12 NOTE — PROGRESS NOTES
Physical Therapy Daily Treatment Note     Visit Date: 8/12/2019    Name: Tirso Araya  [facility] Number: [id number]    Therapy Diagnosis:   Encounter Diagnosis   Name Primary?    Muscle weakness of left upper extremity      Physician: Rayray Norman, *  Dr. Lezama      Physician Orders: PT Eval and Treat   Medical Diagnosis:   S43.432S (ICD-10-CM) - Superior glenoid labrum lesion of left shoulder, sequela   M75.22 (ICD-10-CM) - Bicipital tendinitis of left shoulder     Evaluation Date: 7/23/2019  Authorization Period Expiration: 12/31/2019  Plan of Care Certification Period: 12/31/2019  Visit # / Visits authorized: 4/ 20    Time In: 13:30  Time Out: 14:30  Total Billable Time: 45 minutes    Precautions: Standard      Subjective      Pt reports no new c/o this PM in L shoulder willing to attempt Wilman as tolerated     he was compliant with home exercise program given last session.   Response to previous treatment good   Functional change: none as yet    Pain: 0/10  Location: left shoulder      Objective     Measurements taken:   None this visit     Tirso received therapeutic exercises to develop strength, endurance and core stabilization for 45 minutes including:    Dynamic warm up for golf   T' rot stretch 3x:15 R/L   Man T' rot stretch 3x:15 R/L   [place] cook band rotation R and L 2x10 + 8lb med ball  Golf swing st on 1/2 roll up and down 1/2  Golf swing RS t/o movement 2 rounds EC  St lifts 2x10 R/L 4p  St chops 6p R/L 2x10    Home Exercises Provided and Patient Education Provided     Education provided:   -  Role of core/hips in function     Written Home Exercises Provided: .  Exercises were reviewed and Tirso was able to demonstrate them prior to the end of the session.  Tirso demonstrated good  understanding of the education provided.     See EMR under hand out for exercises provided  8/6/2019      Assessment     geovanna Rx without increase in sxs   Again, Improving core activation which will unload L shoulder  "indirectly with ADLs and rec/leisure activities     Tirso is progressing well towards his goals.   Pt prognosis is Good.     Pt will continue to benefit from skilled outpatient physical therapy to address the deficits listed in the problem list box on initial evaluation, provide pt/family education and to maximize pt's level of independence in the home and community environment.     Pt's spiritual, cultural and educational needs considered and pt agreeable to plan of care and goals.    Anticipated barriers to physical therapy: none    Goals:     Short-Term Goals: 6 weeks  - The patient will be independent with initial home exercise program.  - The patient will demonstrate good unsupported sitting posture with min verbal cues for 15 minutes.  - The patient will increase ROM = to R shoulder to perform recreation/leisure activities including working out/golf with pain < 010.  - The patient will increase strength by 1.2 muscle grade  to perform"   "  with pain < 0/10.      Plan     Continue with established Plan of Care towards PT goals with focus on decreasing pain, increasing ROM, strength, neuromuscular control and functional status       Oumar Kwong, PT   "

## 2019-09-12 ENCOUNTER — OFFICE VISIT (OUTPATIENT)
Dept: SPORTS MEDICINE | Facility: CLINIC | Age: 39
End: 2019-09-12
Payer: COMMERCIAL

## 2019-09-12 DIAGNOSIS — M25.512 LEFT SHOULDER PAIN, UNSPECIFIED CHRONICITY: Primary | ICD-10-CM

## 2019-09-12 PROCEDURE — 99999 PR PBB SHADOW E&M-EST. PATIENT-LVL I: ICD-10-PCS | Mod: PBBFAC,,, | Performed by: ORTHOPAEDIC SURGERY

## 2019-09-12 PROCEDURE — 99213 PR OFFICE/OUTPT VISIT, EST, LEVL III, 20-29 MIN: ICD-10-PCS | Mod: S$GLB,,, | Performed by: ORTHOPAEDIC SURGERY

## 2019-09-12 PROCEDURE — 99999 PR PBB SHADOW E&M-EST. PATIENT-LVL I: CPT | Mod: PBBFAC,,, | Performed by: ORTHOPAEDIC SURGERY

## 2019-09-12 PROCEDURE — 99213 OFFICE O/P EST LOW 20 MIN: CPT | Mod: S$GLB,,, | Performed by: ORTHOPAEDIC SURGERY

## 2019-09-12 NOTE — PROGRESS NOTES
CC: LEFT shoulder f/u     38 y.o. Male who returns for scheduled recheck of the shoulder. Previous MRI and exam suspicious for recurrent SLAP tear/biceps root instability.  S/p 2 previous surgeries - most recently with Dr. Monty Otto. Conservative care recommended. Has included BMAC injection, therapy with Oumar Kwong and rest from golf. Tirso states the shoulder is feeling sig better. Not tested. He did have to jump over a bench and roll over the shoulder [facility] which caused some pain. No instability event. Continues to localize ant-sup with occasional radiation down arm. +Prior pseudoinstability sxs.    PAST MEDICAL HISTORY:   Past Medical History:   Diagnosis Date    Allergy        PAST SURGICAL HISTORY:  Past Surgical History:   Procedure Laterality Date    ADENOIDECTOMY  2006    Dr. Carpio in [place], [place]    TONSILLECTOMY  2006    Dr. Carpio in [place], [place]       FAMILY HISTORY:  Family History   Problem Relation Age of Onset    Thyroid disease Mother     Cancer Father     No Known Problems Sister     No Known Problems Brother     Heart failure Maternal Grandmother     No Known Problems Maternal Grandfather     Cancer Paternal Grandmother     No Known Problems Paternal Grandfather      MEDICATIONS:    Current Outpatient Medications:     diazePAM (VALIUM) 10 MG Tab, Take 1 tablet (10 mg total) by mouth once. for 1 dose, Disp: 1 tablet, Rfl: 0    ALLERGIES:  Review of patient's allergies indicates:  No Known Allergies     REVIEW OF SYSTEMS:  Constitution: Negative. Negative for chills, fever and night sweats.    Hematologic/Lymphatic: Negative for bleeding problem. Does not bruise/bleed easily.   Skin: Negative for dry skin, itching and rash.   Musculoskeletal: Negative for falls. Neg for left shoulder pain and  muscle weakness.     PHYSICAL EXAMINATION:  Vitals:  There were no vitals taken for this visit.   General: Well-developed well-nourished 38 y.o. malein no  acute distress   Cardiovascular: Regular rhythm by palpation of distal pulse, normal color and temperature, no concerning varicosities on symptomatic side   Lungs: No labored breathing or wheezing appreciated   Neuro: Alert and oriented ×3   Psychiatric: well oriented to person, place and time, demonstrates normal mood and affect   Skin: No rashes, lesions or ulcers, normal temperature, turgor, and texture on involved extremity    Ortho/SPM Exam  Examination again of left shoulder demonstrates no sig tenderness deep over the anterior glenohumeral joint line and also over the proximal biceps groove.  Neg AC joint.  No tenderness over the posterior superior glenohumeral joint line. Full overhead range of motion. Negative anterior apprehension. Stable load shift testing.  Grade 1 posterior load and shift, post push pull with a click.  Intact rotator cuff strength without pain on testing.  Neg biceps tension signs. No scapular dyskinesis.    IMAGING:  Xrays including AP, Outlet and Axillary Lateral of LEFT shoulder are ordered / images reviewed by me:   Evidence of previous surgery. No bone loss seen. No significant DJD    MRI of the LEFT shoulder reviewed by me and discussed with patient. Study shows: Status post labral repair.  Contrast undercutting the superior to anterior labrum concerning for residual or recurrent labral tear. Chondral fissuring over the anterior inferior glenoid.    I have discussed the case with Dr. Healy and agree with read.  Contrast undercutting the superior labral biceps attachment and extending into the labral substance consistent with recurrent/residual tearing particular in the context of the patient's exam findings and history.    ASSESSMENT:      ICD-10-CM ICD-9-CM   1. Left shoulder pain, unspecified chronicity M25.512 719.41     PLAN:       The patient has responded well to conservative tx. Stable on exam. No pain. Continue rehab program and plan is to gradually reintegrate back into  golf. Will continue to monitor things. Discussed potential repeat dx scope with possible open subpectoral biceps tenodesis if he continues to have problems despite conservative tx. He will update me via telephone.     Procedures

## 2020-03-25 ENCOUNTER — DOCUMENTATION ONLY (OUTPATIENT)
Dept: REHABILITATION | Facility: HOSPITAL | Age: 40
End: 2020-03-25

## 2020-03-25 NOTE — DISCHARGE SUMMARY
PHYSICAL THERAPY DISCHARGE SUMMARY   Name: Tirso [facility] Number: [id number]  Diagnosis: Left shoulder pain   Physician: Lise  Treatment Orders: PT Eval and Treat  Past Medical History:   Diagnosis Date    Allergy      Initial visit: 7/23/2019  Date of Last visit: 8/12/2019  Date of Discharge Note: 3/25/2020  Total Visits Received: 4  Missed Visits: 0  ASSESSMENT   Status Towards Goals Met:   Fair to good   ?  ?  Goals Not achieved and why: pt with intermittent left shoulder pain   Discharge reason : Patient self discharge  PLAN   This patient is discharged from Physical Therapy Services.   ?  Therapist: Oumar Kwong, PT

## 2020-06-12 ENCOUNTER — TELEPHONE (OUTPATIENT)
Dept: SPORTS MEDICINE | Facility: CLINIC | Age: 40
End: 2020-06-12

## 2020-06-12 NOTE — TELEPHONE ENCOUNTER
Spoke to pt regarding $1,500 bill that he keeps receiving and assured him he does not owe this amount. Jose Pérezlor verified with billing his balance is $0 and he will not receive this bill again.

## 2020-09-24 ENCOUNTER — OFFICE VISIT (OUTPATIENT)
Dept: UROLOGY | Facility: CLINIC | Age: 40
End: 2020-09-24
Payer: COMMERCIAL

## 2020-09-24 VITALS
WEIGHT: 157.06 LBS | DIASTOLIC BLOOD PRESSURE: 71 MMHG | BODY MASS INDEX: 22.48 KG/M2 | HEIGHT: 70 IN | SYSTOLIC BLOOD PRESSURE: 109 MMHG | HEART RATE: 72 BPM

## 2020-09-24 DIAGNOSIS — Z30.2 ENCOUNTER FOR MALE STERILIZATION PROCEDURE: Primary | ICD-10-CM

## 2020-09-24 PROCEDURE — 99999 PR PBB SHADOW E&M-EST. PATIENT-LVL III: CPT | Mod: PBBFAC,,, | Performed by: UROLOGY

## 2020-09-24 PROCEDURE — 3008F PR BODY MASS INDEX (BMI) DOCUMENTED: ICD-10-PCS | Mod: CPTII,S$GLB,, | Performed by: UROLOGY

## 2020-09-24 PROCEDURE — 99214 PR OFFICE/OUTPT VISIT, EST, LEVL IV, 30-39 MIN: ICD-10-PCS | Mod: S$GLB,,, | Performed by: UROLOGY

## 2020-09-24 PROCEDURE — 3008F BODY MASS INDEX DOCD: CPT | Mod: CPTII,S$GLB,, | Performed by: UROLOGY

## 2020-09-24 PROCEDURE — 99214 OFFICE O/P EST MOD 30 MIN: CPT | Mod: S$GLB,,, | Performed by: UROLOGY

## 2020-09-24 PROCEDURE — 99999 PR PBB SHADOW E&M-EST. PATIENT-LVL III: ICD-10-PCS | Mod: PBBFAC,,, | Performed by: UROLOGY

## 2020-09-24 RX ORDER — LIDOCAINE HYDROCHLORIDE 10 MG/ML
20 INJECTION INFILTRATION; PERINEURAL ONCE
Status: SHIPPED | OUTPATIENT
Start: 2020-09-24

## 2020-09-24 NOTE — PROGRESS NOTES
Chief Complaint:   Undesired fertility    HPI:  Mr. Araya is a 39 y.o.  male who presents for evaluation re vasectomy. He has 3 children, ages 9, 7, and 4 yo, and he is certain that he desires no more. He is aware of other forms of contraception.  He's currently using condoms for contraception. He is aware that vasectomy is to be considered permanent/irreversible.    He denies orchalgia.  No dysuria.  No hematuria.    ROS:  Tirso Araya denies headache, blurred vision, fever, nausea, vomiting, chills, abdominal pain, chest pain, sore throat, bleeding per rectum, cough, SOB, recent loss of consciousness, recent mental status changes, seizures, dizziness, or upper or lower extremity weakness.    Past Medical History    Past Medical History:   Diagnosis Date    Allergy        Past Surgical History    Past Surgical History:   Procedure Laterality Date    ADENOIDECTOMY  2006    Dr. Carpio in [place], [place]    TONSILLECTOMY  2006    Dr. Carpio in [place], [place]       Social History    Social History     Socioeconomic History    Marital status:      Spouse name: Not on file    Number of children: Not on file    Years of education: Not on file    Highest education level: Not on file   Occupational History    Not on file   Social Needs    Financial resource strain: Not on file    Food insecurity     Worry: Not on file     Inability: Not on file    Transportation needs     Medical: Not on file     Non-medical: Not on file   Tobacco Use    Smoking status: Never Smoker    Smokeless tobacco: Never Used   Substance and Sexual Activity    Alcohol use: No    Drug use: Not on file    Sexual activity: Not on file   Lifestyle    Physical activity     Days per week: Not on file     Minutes per session: Not on file    Stress: Not on file   Relationships    Social connections     Talks on phone: Not on file     Gets together: Not on file     Attends Latter day service: Not on file     Active member of  club or organization: Not on file     Attends meetings of clubs or organizations: Not on file     Relationship status: Not on file   Other Topics Concern    Not on file   Social History Narrative    Not on file       Family History  Family History   Problem Relation Age of Onset    Thyroid disease Mother     Cancer Father     No Known Problems Sister     No Known Problems Brother     Heart failure Maternal Grandmother     No Known Problems Maternal Grandfather     Cancer Paternal Grandmother     No Known Problems Paternal Grandfather          Medications    Current Outpatient Medications:     diazePAM (VALIUM) 10 MG Tab, Take 1 tablet (10 mg total) by mouth once. for 1 dose, Disp: 1 tablet, Rfl: 0    Current Facility-Administered Medications:     lidocaine HCL 10 mg/ml (1%) injection 20 mL, 20 mL, Other, Once, Ruddy Seth MD    Allergies  Review of patient's allergies indicates:  No Known Allergies      ?  PHYSICAL EXAM:    The patient generally appears in good health, is appropriately interactive, and is in no apparent distress.     Eyes: anicteric sclerae, moist conjunctivae; no lid-lag; PERRLA     HENT: Atraumatic; oropharynx clear with moist mucous membranes and no mucosal ulcerations;normal hard and soft palate.  No evidence of lymphadenopathy.    Neck: Trachea midline.  No thyromegaly.    Musculoskeletal: No abnormal gait.    Skin: No lesions.    Mental: Cooperative with normal affect.  Is oriented to time, place, and person.    Neuro: Grossly intact.    Chest: Normal inspiratory effort.   No accessory muscles.  No audible wheezes.  Respirations symmetric on inspiration and expiration.    Heart: Regular rhythm.      Abdomen:  Soft, non-tender. No masses or organomegaly. Bladder is not palpable. No evidence of flank discomfort. No evidence of inguinal hernia.    Genitourinary: The penis has no evidence of plaques or induration. The urethral meatus is normal. The testes, epididymides, and cord  structures are normal in size and contour bilaterally. The scrotum is normal in size and contour.    Extremities: No clubbing, cyanosis, or edema          A/P:  Tirso Araya is a 39 y.o. male who presents for evaluation re vasectomy.    1.I discussed with the patient risks and benefits, as well as alternatives. We discussed possible complications at length, including fever, infection, bleeding--possibly requiring reoperation,testicular injury or atrophy with loss of function, chronic pain, persistent or recurrent presence of sperm in the ejaculate, vasal recanalization, as well as the risks attendant to the anesthetic.  2.We discussed that he should stop aspirin, ibuprofen, and similar products at least one week prior to the procedure. Acetominophen is okay to use for headaches, pain, etc.  3. He should bring an athletic supporter and loose fitting sweat pants on the day of the procedure.  4. We discussed that he must continue to use contraception until two consecutive semen analyses (checked at approximately 4-6 weeks post-vasectomy) reveal azoospermia.  5. He will schedule an elective vasectomy.

## 2020-09-24 NOTE — PATIENT INSTRUCTIONS
Having a Vasectomy: Before, During, and After the Procedure  Vasectomy is an outpatient (same day) procedure. It can be done in a doctors office, clinic, or hospital. Your doctor will talk with you about preparing for surgery. He or she will also discuss the possible risks and complications with you. After the procedure, follow all your doctors advice for recovery.  Preparing for Surgery      The cut ends of the vas may be tied, closed with a clip, or sealed by heat (cauterized).    Your doctor will talk with you about getting ready for surgery. You may be asked to do the following:  · Sign a consent form. This must be done at least a few days before surgery. It gives your doctor permission to do the procedure. It also states that a vasectomy is not guaranteed to make you sterile.  · Dont take aspirin, ibuprofen, or naproxen for 1 week before surgery or 1 week after. These medications can cause bleeding after the procedure. Also, tell your doctor if you take any medications, supplements, or herbal remedies.  · Tell your doctor if youve had any prior scrotal surgery.  · Arrange for an adult family member or friend to give you a ride home after surgery.  · Shower and clean your scrotum the day of surgery. Your doctor may also ask you to shave your scrotum.  · Bring an athletic supporter (jockstrap) and a pair of loose fitting pants to the doctors office or hospital.  · Eat something with sugar before surgery.  During Surgery  The entire procedure usually lasts less than 30 minutes.  · Youll be asked to undress and lie on a table.  · To prevent pain during surgery, youll be given an injection of local anesthetic in your scrotum or lower groin.  · Once the area is numb, one or two small incisions are made in the scrotum.   · The vas deferens are lifted through the incision and cut. The ends of the vas are then sealed off using one of several methods.  · If needed, the incision is closed with stitches.  · You can  rest for a while until youre ready to go home.  Recovering at Home  For about a week, your scrotum may look bruised and slightly swollen. You may also have a small amount of bloody discharge from the incision. This is normal.  To help make your recovery more comfortable, follow the tips below.  · Stay off your feet as much as possible for the first 2 days.   · Wear an athletic supporter or snug cotton briefs for support.  · Ice the area for 24 hours  · Take medications with acetaminophen (such as Tylenol) to relieve any discomfort. Dont use aspirin, ibuprofen, or naproxen.  · Avoid heavy lifting, exercise, or intercourse for 7 days.  · Remember: You must use another form of birth control until youre completely sterile.  Call your doctor if you notice any of the following after surgery:   · Increasing pain or swelling in your scrotum  · A large black-and-blue area, or a growing lump  · Fever or chills  · Increasing redness or drainage of the incision  · Trouble urinating    Sex After Vasectomy  Vasectomy doesnt change your sexual function. So when you start having sex again, it should feel the same as before. A vasectomy also shouldnt affect your relationship with your partner. Its important to remember, though, that you wont become sterile right away. It will take time before you can have sex without the need for birth control.  · Until youre sterile: After a vasectomy, some active sperm still remain in your semen. It will take time and many ejaculations before the sperm are completely gone. During this period, you must use another birth control method to prevent pregnancy. To make sure no sperm are left in your semen, youll need to have one or more semen exams. You usually collect a semen sample at home and bring it to a lab. The sample is then checked under a microscope. Youre sterile only when these samples show no evidence of sperm. Ask your doctor whether additional follow-up is needed.  · After  youre sterile: After your doctor tells you youre sterile, you no longer need to use any form of birth control. Youre free to have sex without the fear of unwanted pregnancy. However, a vasectomy does not protect you from sexually transmitted diseases (STDs). If you have more than one sex partner, be sure to practice safer sex by using condoms.  © 8078-6853 [employer], [address] [place] [address], [place] [address]. All rights reserved. This information is not intended as a substitute for professional medical care. Always follow your healthcare professional's instructions.    Vasectomy: Risks and Complications  A vasectomy is an outpatient procedure. This means youll go home the same day. Its done in a doctors office, clinic, or hospital. Before your procedure, youll be asked to read and sign a consent form. This form states youre aware of the possible risks and complications. It also says that you understand that the procedure, though most often successful, cant promise to make you sterile. Be sure you have all your questions answered before you sign this form. Below is a list of risks and possible complications of the procedure.  Risks and Possible Complications of Vasectomy   Vasectomy is safe. But it does have risks. They include the following:  · Bleeding or infection.  · Sperm granuloma. This is a small, harmless lump. It may form where the vas deferens is sealed off.  · Loss of Testicle  · Epididymitis.This is inflammation that may cause scrotal aching. It often goes away without treatment. Anti-inflammatory medications can provide relief.  · Reconnection of the vas deferens. This can occur in rare cases. It makes you fertile again. This can result in an unwanted pregnancy.  · Sperm antibodies.These are a common response of the body to absorbed sperm. The antibodies can make you sterile. This is true even if you later try to reverse your vasectomy.  · Long-term testicular discomfort.This may occur  after surgery. But its very rare.    © 4462-0666 [employer]ain, [address] [place] [address], [place] [address]. All rights reserved. This information is not intended as a substitute for professional medical care. Always follow your healthcare professional's instructions.

## 2020-11-09 ENCOUNTER — TELEPHONE (OUTPATIENT)
Dept: UROLOGY | Facility: CLINIC | Age: 40
End: 2020-11-09

## 2020-11-09 NOTE — TELEPHONE ENCOUNTER
Returned pts call. Spoke to pts wife Mary. appt r/s for Monday 11/23/20. Instructions reviewed with wife. She verbalized understanding.